# Patient Record
Sex: FEMALE | Race: WHITE | NOT HISPANIC OR LATINO
[De-identification: names, ages, dates, MRNs, and addresses within clinical notes are randomized per-mention and may not be internally consistent; named-entity substitution may affect disease eponyms.]

---

## 2022-06-02 ENCOUNTER — APPOINTMENT (OUTPATIENT)
Dept: PODIATRY | Facility: CLINIC | Age: 42
End: 2022-06-02
Payer: MEDICARE

## 2022-06-02 VITALS — BODY MASS INDEX: 24.84 KG/M2 | HEIGHT: 62 IN | WEIGHT: 135 LBS

## 2022-06-02 NOTE — PHYSICAL EXAM
[General Appearance - Alert] : alert [General Appearance - In No Acute Distress] : in no acute distress [Sensation] : the sensory exam was normal to light touch and pinprick [No Focal Deficits] : no focal deficits [Deep Tendon Reflexes (DTR)] : deep tendon reflexes were 2+ and symmetric [Motor Exam] : the motor exam was normal [Oriented To Time, Place, And Person] : oriented to person, place, and time [Impaired Insight] : insight and judgment were intact [Affect] : the affect was normal [FreeTextEntry3] : The vascular exam reveals decreased pedal pulses bilateral, a capillary fill time of 3-5 seconds,  mild atrophic skin changes, mild varicosities absence of hair growth, but no cyanosis, clubbing or mottling seen. [de-identified] : overall muscle strength testing is decreased, but consistant with the patients age and medical problems. Mild atrophy and overall weakness present.\par  [FreeTextEntry1] : Dermatologic exam reveals no significant findings. No sign of subcutaneous nodules skin lesions or ulcers. Webspaces are clear there are no sign of infection cellulitis or erythema.\par The patient has all contributing factors to onychomycosis including but not limited to thickness, subungual debris, discoloration and partial lysis and they are brittle when cut.\par

## 2022-06-02 NOTE — PROCEDURE
[FreeTextEntry1] : A lengthy and inform a discussion with the patient regarding different types of treatments for the mycotic nail disease. I stressed clinical versus mycologic cure rates and anticipated success rates regarding topical medications oral medications as well as laser therapy. I discussed in great length with the patient the success rates and success rates anticipated. There were no guarantees given regarding any of the treatment reviewed. I did explain to the patient that there are risks to oral antifungal therapy however those risks can be greatly decreased with obtaining blood tests prior and possibly during the treatment if indicated. The patient will weigh their options and contact the office\par I have had a lengthy discussion with the patient regarding overall skincare. The importance of the type of socks, the type of shoes, and the type of overall foot hygiene is important to help control and prevent eruptions especially over extreme weather changes. This included but was not limited to hydration and lubrication, dove soap, triple rinse clothing and linens. I also explained the importance of thorough drying of both feet especially the web spaces. Given the extreme temperatures back in a car I also reviewed the type of shoes that would help reduce the chances of cracking of the skin especially leading to fissuring of the heels. Overall skincare precautions were reviewed education literature dispensed in the patient's questions asked and answered appropriately.\par Using sterile instrumentation debridement of all nails manually and electrically to decrease thickness, pain and girth and make shoe gear more comfortable with "slant back" procedure of any bordering spicules causing pain\par \par

## 2022-06-02 NOTE — HISTORY OF PRESENT ILLNESS
[FreeTextEntry1] : Location-both feet\par Duration- many years\par Past tx-self tx, etiology-unknown\par

## 2022-08-15 ENCOUNTER — APPOINTMENT (OUTPATIENT)
Dept: PODIATRY | Facility: CLINIC | Age: 42
End: 2022-08-15

## 2022-08-15 VITALS — HEIGHT: 62 IN | WEIGHT: 135 LBS | BODY MASS INDEX: 24.84 KG/M2

## 2022-08-15 NOTE — PROCEDURE
[FreeTextEntry1] : Using sterile instrumentation debridement of all nails manually and electrically to decrease thickness, pain and girth and make shoe gear more comfortable with "slant back" procedure of any bordering spicules causing pain\par follow up prn\par \par \par

## 2022-08-15 NOTE — PHYSICAL EXAM
[FreeTextEntry3] : The vascular exam reveals decreased pedal pulses bilateral, a capillary fill time of 3-5 seconds,  mild atrophic skin changes, mild varicosities absence of hair growth, but no cyanosis, clubbing or mottling seen. [FreeTextEntry1] : Dermatologic exam reveals no significant findings. No sign of subcutaneous nodules skin lesions or ulcers. Webspaces are clear there are no sign of infection cellulitis or erythema.\par The patient has all contributing factors to onychomycosis including but not limited to thickness, subungual debris, discoloration and partial lysis and they are brittle when cut.\par

## 2022-10-24 ENCOUNTER — APPOINTMENT (OUTPATIENT)
Dept: PODIATRY | Facility: CLINIC | Age: 42
End: 2022-10-24

## 2022-10-24 VITALS — HEIGHT: 62 IN | WEIGHT: 135 LBS | BODY MASS INDEX: 24.84 KG/M2

## 2022-10-24 NOTE — PROCEDURE
[FreeTextEntry1] : Using sterile instrumentation debridement of all nails manually and electrically to decrease thickness, pain and girth and make shoe gear more comfortable with "slant back" procedure of any bordering spicules causing pain\par Utilizing a scalpel and sterile aseptic technique sharp debridement of multiple hyperkeratotic lesions performed. Appropriate padding were necessary.\par I have had a lengthy discussion with the patient regarding overall skincare. The importance of the type of socks, the type of shoes, and the type of overall foot hygiene is important to help control and prevent eruptions especially over extreme weather changes. This included but was not limited to hydration and lubrication, dove soap, triple rinse clothing and linens. I also explained the importance of thorough drying of both feet especially the web spaces. Given the extreme temperatures back in a car I also reviewed the type of shoes that would help reduce the chances of cracking of the skin especially leading to fissuring of the heels. Overall skincare precautions were reviewed education literature dispensed in the patient's questions asked and answered appropriately.\par \par follow up prn\par \par \par

## 2022-10-24 NOTE — PHYSICAL EXAM
[FreeTextEntry3] : The vascular exam reveals decreased pedal pulses bilateral, a capillary fill time of 3-5 seconds,  mild atrophic skin changes, mild varicosities absence of hair growth, but no cyanosis, clubbing or mottling seen. [FreeTextEntry1] : \par The patient has all contributing factors to onychomycosis including but not limited to thickness, subungual debris, discoloration and partial lysis and they are brittle when cut.\par Painful hyperkeratotic lesions noted sub 5 both feet\par \par

## 2023-01-04 ENCOUNTER — APPOINTMENT (OUTPATIENT)
Dept: PODIATRY | Facility: CLINIC | Age: 43
End: 2023-01-04

## 2023-01-17 ENCOUNTER — APPOINTMENT (OUTPATIENT)
Dept: PODIATRY | Facility: CLINIC | Age: 43
End: 2023-01-17
Payer: MEDICARE

## 2023-01-17 VITALS — BODY MASS INDEX: 23.92 KG/M2 | HEIGHT: 62 IN | WEIGHT: 130 LBS

## 2023-01-17 NOTE — PROCEDURE
[FreeTextEntry1] : Using sterile instrumentation debridement of all nails manually and electrically to decrease thickness, pain and girth and make shoe gear more comfortable with "slant back" procedure of any bordering spicules causing pain\par Utilizing a scalpel and sterile aseptic technique sharp debridement of multiple hyperkeratotic lesions performed. Appropriate padding were necessary.\par follow up prn\par

## 2023-04-14 ENCOUNTER — APPOINTMENT (OUTPATIENT)
Dept: PODIATRY | Facility: HOSPITAL | Age: 43
End: 2023-04-14
Payer: MEDICARE

## 2023-04-14 VITALS — HEIGHT: 62 IN | WEIGHT: 130 LBS | BODY MASS INDEX: 23.92 KG/M2

## 2023-10-26 ENCOUNTER — NON-APPOINTMENT (OUTPATIENT)
Age: 43
End: 2023-10-26

## 2023-11-29 ENCOUNTER — APPOINTMENT (OUTPATIENT)
Dept: DERMATOLOGY | Facility: CLINIC | Age: 43
End: 2023-11-29
Payer: COMMERCIAL

## 2023-11-29 PROCEDURE — 99204 OFFICE O/P NEW MOD 45 MIN: CPT

## 2023-12-19 ENCOUNTER — NON-APPOINTMENT (OUTPATIENT)
Age: 43
End: 2023-12-19

## 2024-01-31 ENCOUNTER — TRANSCRIPTION ENCOUNTER (OUTPATIENT)
Age: 44
End: 2024-01-31

## 2024-01-31 ENCOUNTER — APPOINTMENT (OUTPATIENT)
Dept: INTERNAL MEDICINE | Facility: CLINIC | Age: 44
End: 2024-01-31
Payer: COMMERCIAL

## 2024-01-31 ENCOUNTER — NON-APPOINTMENT (OUTPATIENT)
Age: 44
End: 2024-01-31

## 2024-01-31 VITALS
BODY MASS INDEX: 33.91 KG/M2 | SYSTOLIC BLOOD PRESSURE: 122 MMHG | WEIGHT: 211 LBS | OXYGEN SATURATION: 100 % | TEMPERATURE: 97.9 F | DIASTOLIC BLOOD PRESSURE: 78 MMHG | HEIGHT: 66 IN | HEART RATE: 74 BPM

## 2024-01-31 DIAGNOSIS — Z83.438 FAMILY HISTORY OF OTHER DISORDER OF LIPOPROTEIN METABOLISM AND OTHER LIPIDEMIA: ICD-10-CM

## 2024-01-31 DIAGNOSIS — Z00.00 ENCOUNTER FOR GENERAL ADULT MEDICAL EXAMINATION W/OUT ABNORMAL FINDINGS: ICD-10-CM

## 2024-01-31 DIAGNOSIS — Z78.9 OTHER SPECIFIED HEALTH STATUS: ICD-10-CM

## 2024-01-31 DIAGNOSIS — C44.319 BASAL CELL CARCINOMA OF SKIN OF OTHER PARTS OF FACE: ICD-10-CM

## 2024-01-31 DIAGNOSIS — Z82.49 FAMILY HISTORY OF ISCHEMIC HEART DISEASE AND OTHER DISEASES OF THE CIRCULATORY SYSTEM: ICD-10-CM

## 2024-01-31 PROCEDURE — 93000 ELECTROCARDIOGRAM COMPLETE: CPT

## 2024-01-31 PROCEDURE — 99386 PREV VISIT NEW AGE 40-64: CPT

## 2024-01-31 RX ORDER — LEVONORGESTREL AND ETHINYL ESTRADIOL 0.15-0.03
KIT ORAL
Refills: 0 | Status: ACTIVE | COMMUNITY

## 2024-01-31 RX ORDER — DICYCLOMINE HYDROCHLORIDE 10 MG/1
10 CAPSULE ORAL DAILY
Refills: 0 | Status: DISCONTINUED | COMMUNITY
End: 2024-01-31

## 2024-01-31 NOTE — PLAN
[FreeTextEntry1] : 43-year-old female here for new patient annual physical She is undergoing basal cell Mohs surgery next week, will check the coags along with CBC CMP Healthcare maintenance: Routine blood work at the lab Patient has up-to-date mammo and Pap.  She has been referred to GI for screening

## 2024-01-31 NOTE — HISTORY OF PRESENT ILLNESS
[de-identified] : 43F, here to establish care with a new PCP.  Pt is scheduled for undergo MOHS procedure on 2/6/2024 for left facial BCC, previously treated with 5FU but enlarging.

## 2024-01-31 NOTE — HEALTH RISK ASSESSMENT
[Never] : Never [MammogramDate] : 12/2022 [MammogramComments] : pt has appt for 2/2024 [PapSmearDate] : 1/2024 [ColonoscopyComments] : pt has been referred to GI

## 2024-02-02 ENCOUNTER — RESULT CHARGE (OUTPATIENT)
Age: 44
End: 2024-02-02

## 2024-02-07 LAB
ALBUMIN SERPL ELPH-MCNC: 4.4 G/DL
ALP BLD-CCNC: 65 U/L
ALT SERPL-CCNC: 17 U/L
ANION GAP SERPL CALC-SCNC: 10 MMOL/L
APPEARANCE: CLEAR
APTT BLD: 29.4 SEC
AST SERPL-CCNC: 16 U/L
BACTERIA: NEGATIVE /HPF
BASOPHILS # BLD AUTO: 0.02 K/UL
BASOPHILS NFR BLD AUTO: 0.3 %
BILIRUB SERPL-MCNC: 0.3 MG/DL
BILIRUBIN URINE: NEGATIVE
BLOOD URINE: ABNORMAL
BUN SERPL-MCNC: 14 MG/DL
CALCIUM SERPL-MCNC: 9.4 MG/DL
CAST: 0 /LPF
CHLORIDE SERPL-SCNC: 102 MMOL/L
CHOLEST SERPL-MCNC: 192 MG/DL
CO2 SERPL-SCNC: 24 MMOL/L
COLOR: YELLOW
CREAT SERPL-MCNC: 0.72 MG/DL
EGFR: 106 ML/MIN/1.73M2
EOSINOPHIL # BLD AUTO: 0.1 K/UL
EOSINOPHIL NFR BLD AUTO: 1.5 %
EPITHELIAL CELLS: 4 /HPF
ESTIMATED AVERAGE GLUCOSE: 111 MG/DL
GLUCOSE QUALITATIVE U: NEGATIVE MG/DL
GLUCOSE SERPL-MCNC: 89 MG/DL
HBA1C MFR BLD HPLC: 5.5 %
HCT VFR BLD CALC: 37 %
HDLC SERPL-MCNC: 60 MG/DL
HGB BLD-MCNC: 11.6 G/DL
IMM GRANULOCYTES NFR BLD AUTO: 0.4 %
INR PPP: 0.95 RATIO
KETONES URINE: NEGATIVE MG/DL
LDLC SERPL CALC-MCNC: 108 MG/DL
LEUKOCYTE ESTERASE URINE: ABNORMAL
LYMPHOCYTES # BLD AUTO: 1.33 K/UL
LYMPHOCYTES NFR BLD AUTO: 19.9 %
MAN DIFF?: NORMAL
MCHC RBC-ENTMCNC: 25.3 PG
MCHC RBC-ENTMCNC: 31.4 GM/DL
MCV RBC AUTO: 80.6 FL
MICROSCOPIC-UA: NORMAL
MONOCYTES # BLD AUTO: 0.44 K/UL
MONOCYTES NFR BLD AUTO: 6.6 %
NEUTROPHILS # BLD AUTO: 4.76 K/UL
NEUTROPHILS NFR BLD AUTO: 71.3 %
NITRITE URINE: NEGATIVE
NONHDLC SERPL-MCNC: 132 MG/DL
PH URINE: 6
PLATELET # BLD AUTO: 330 K/UL
POTASSIUM SERPL-SCNC: 4.9 MMOL/L
PROT SERPL-MCNC: 7.1 G/DL
PROTEIN URINE: NEGATIVE MG/DL
PT BLD: 10.7 SEC
RBC # BLD: 4.59 M/UL
RBC # FLD: 14.3 %
RED BLOOD CELLS URINE: 1 /HPF
REVIEW: NORMAL
SODIUM SERPL-SCNC: 136 MMOL/L
SPECIFIC GRAVITY URINE: 1.01
TRIGL SERPL-MCNC: 140 MG/DL
TSH SERPL-ACNC: 0.75 UIU/ML
UROBILINOGEN URINE: 0.2 MG/DL
WBC # FLD AUTO: 6.68 K/UL
WHITE BLOOD CELLS URINE: 11 /HPF

## 2024-02-23 ENCOUNTER — APPOINTMENT (OUTPATIENT)
Dept: INTERNAL MEDICINE | Facility: CLINIC | Age: 44
End: 2024-02-23

## 2024-04-02 DIAGNOSIS — Z13.1 ENCOUNTER FOR SCREENING FOR DIABETES MELLITUS: ICD-10-CM

## 2025-08-06 ENCOUNTER — APPOINTMENT (OUTPATIENT)
Dept: INTERNAL MEDICINE | Facility: CLINIC | Age: 45
End: 2025-08-06
Payer: COMMERCIAL

## 2025-08-06 VITALS
SYSTOLIC BLOOD PRESSURE: 123 MMHG | HEART RATE: 80 BPM | DIASTOLIC BLOOD PRESSURE: 77 MMHG | BODY MASS INDEX: 35.2 KG/M2 | HEIGHT: 66 IN | TEMPERATURE: 98 F | WEIGHT: 219 LBS | OXYGEN SATURATION: 100 %

## 2025-08-06 DIAGNOSIS — Z13.29 ENCOUNTER FOR SCREENING FOR OTHER SUSPECTED ENDOCRINE DISORDER: ICD-10-CM

## 2025-08-06 DIAGNOSIS — Z13.1 ENCOUNTER FOR SCREENING FOR DIABETES MELLITUS: ICD-10-CM

## 2025-08-06 DIAGNOSIS — Z00.00 ENCOUNTER FOR GENERAL ADULT MEDICAL EXAMINATION W/OUT ABNORMAL FINDINGS: ICD-10-CM

## 2025-08-06 DIAGNOSIS — M79.661 PAIN IN RIGHT LOWER LEG: ICD-10-CM

## 2025-08-06 DIAGNOSIS — Z13.220 ENCOUNTER FOR SCREENING FOR LIPOID DISORDERS: ICD-10-CM

## 2025-08-06 PROCEDURE — 93000 ELECTROCARDIOGRAM COMPLETE: CPT

## 2025-08-06 PROCEDURE — 99396 PREV VISIT EST AGE 40-64: CPT

## 2025-08-06 RX ORDER — LEVONORGESTREL AND ETHINYL ESTRADIOL 0.15-0.03
0.15-3 KIT ORAL DAILY
Refills: 0 | Status: ACTIVE | COMMUNITY
Start: 2025-08-06

## 2025-08-15 ENCOUNTER — LABORATORY RESULT (OUTPATIENT)
Age: 45
End: 2025-08-15

## 2025-08-15 DIAGNOSIS — D64.9 ANEMIA, UNSPECIFIED: ICD-10-CM

## 2025-08-18 LAB
ALBUMIN SERPL ELPH-MCNC: 4.2 G/DL
ALP BLD-CCNC: 74 U/L
ALT SERPL-CCNC: 23 U/L
ANION GAP SERPL CALC-SCNC: 14 MMOL/L
APPEARANCE: CLEAR
AST SERPL-CCNC: 18 U/L
BASOPHILS # BLD AUTO: 0.03 K/UL
BASOPHILS NFR BLD AUTO: 0.5 %
BILIRUB SERPL-MCNC: 0.3 MG/DL
BILIRUBIN URINE: NEGATIVE
BLOOD URINE: ABNORMAL
BUN SERPL-MCNC: 11 MG/DL
CALCIUM SERPL-MCNC: 9.1 MG/DL
CHLORIDE SERPL-SCNC: 105 MMOL/L
CHOLEST SERPL-MCNC: 190 MG/DL
CO2 SERPL-SCNC: 22 MMOL/L
COLOR: YELLOW
CREAT SERPL-MCNC: 0.77 MG/DL
EGFRCR SERPLBLD CKD-EPI 2021: 96 ML/MIN/1.73M2
EOSINOPHIL # BLD AUTO: 0.12 K/UL
EOSINOPHIL NFR BLD AUTO: 1.8 %
ESTIMATED AVERAGE GLUCOSE: 105 MG/DL
FERRITIN SERPL-MCNC: 18 NG/ML
FOLATE SERPL-MCNC: >20 NG/ML
GLUCOSE QUALITATIVE U: NEGATIVE MG/DL
GLUCOSE SERPL-MCNC: 89 MG/DL
HBA1C MFR BLD HPLC: 5.3 %
HCT VFR BLD CALC: 34.2 %
HDLC SERPL-MCNC: 53 MG/DL
HGB BLD-MCNC: 10.5 G/DL
IMM GRANULOCYTES NFR BLD AUTO: 0.3 %
IRON SATN MFR SERPL: 13 %
IRON SERPL-MCNC: 47 UG/DL
KETONES URINE: NEGATIVE MG/DL
LDLC SERPL-MCNC: 108 MG/DL
LEUKOCYTE ESTERASE URINE: ABNORMAL
LYMPHOCYTES # BLD AUTO: 0.83 K/UL
LYMPHOCYTES NFR BLD AUTO: 12.7 %
MAN DIFF?: NORMAL
MCHC RBC-ENTMCNC: 25.4 PG
MCHC RBC-ENTMCNC: 30.7 G/DL
MCV RBC AUTO: 82.6 FL
MONOCYTES # BLD AUTO: 0.36 K/UL
MONOCYTES NFR BLD AUTO: 5.5 %
NEUTROPHILS # BLD AUTO: 5.17 K/UL
NEUTROPHILS NFR BLD AUTO: 79.2 %
NITRITE URINE: NEGATIVE
NONHDLC SERPL-MCNC: 137 MG/DL
PH URINE: 5.5
PLATELET # BLD AUTO: 229 K/UL
POTASSIUM SERPL-SCNC: 4.2 MMOL/L
PROT SERPL-MCNC: 7.2 G/DL
PROTEIN URINE: NEGATIVE MG/DL
RBC # BLD: 4.14 M/UL
RBC # FLD: 14.8 %
SODIUM SERPL-SCNC: 141 MMOL/L
SPECIFIC GRAVITY URINE: 1.01
TIBC SERPL-MCNC: 350 UG/DL
TRIGL SERPL-MCNC: 170 MG/DL
TSH SERPL-ACNC: 1.53 UIU/ML
UIBC SERPL-MCNC: 303 UG/DL
UROBILINOGEN URINE: 0.2 MG/DL
VIT B12 SERPL-MCNC: 398 PG/ML
WBC # FLD AUTO: 6.53 K/UL